# Patient Record
Sex: MALE | Race: WHITE | NOT HISPANIC OR LATINO | URBAN - METROPOLITAN AREA
[De-identification: names, ages, dates, MRNs, and addresses within clinical notes are randomized per-mention and may not be internally consistent; named-entity substitution may affect disease eponyms.]

---
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
## 2024-09-30 ENCOUNTER — OFFICE VISIT (OUTPATIENT)
Dept: URGENT CARE | Facility: CLINIC | Age: 24
End: 2024-09-30

## 2024-09-30 VITALS
OXYGEN SATURATION: 98 % | SYSTOLIC BLOOD PRESSURE: 116 MMHG | HEART RATE: 72 BPM | TEMPERATURE: 98.7 F | RESPIRATION RATE: 16 BRPM | DIASTOLIC BLOOD PRESSURE: 62 MMHG

## 2024-09-30 DIAGNOSIS — F10.90 ALCOHOL USE DISORDER: ICD-10-CM

## 2024-09-30 ASSESSMENT — ENCOUNTER SYMPTOMS
SHORTNESS OF BREATH: 0
MEMORY LOSS: 0

## 2024-09-30 ASSESSMENT — LIFESTYLE VARIABLES: SUBSTANCE_ABUSE: 1

## 2024-09-30 NOTE — LETTER
September 30, 2024         Patient: Leo Barrios   YOB: 2000   Date of Visit: 9/30/2024           To Whom it May Concern:    Leo Barrios was seen in my clinic on 9/30/2024.  Please excuse his absence from work today and tomorrow.    If you have any questions or concerns, please don't hesitate to call.        Sincerely,           Young Dotson P.A.-C.  Electronically Signed

## 2024-09-30 NOTE — PROGRESS NOTES
"  Subjective:   Leo Barrios is a 24 y.o. male who presents today with   Chief Complaint   Patient presents with    Other     Patient requesting disulfiram        Other  Pertinent negatives include no chest pain.       Patient states he is coming in to seek treatment for alcohol use.  He states that he has been drinking most days when he \"has free time\".  He states the last time he drank was on Saturday this weekend and had about 8 beers and a bottle of whiskey.  He states this is pretty typical for him.  He is not having any acute symptoms.  Is not on any medications and states he does not use any other drugs.  Patient is requesting disulfiram to be prescribed at this time to help with his alcohol use.  No suicidal or homicidal ideation.    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    He has no past medical history of Depression or Heart murmur.  MEDS: No current outpatient medications on file.  ALLERGIES: No Known Allergies  SURGHX: No past surgical history on file.  SOCHX:  reports that he has never smoked. He has never used smokeless tobacco. He reports that he does not drink alcohol and does not use drugs.  FH: Reviewed with patient, not pertinent to this visit.       Review of Systems   Respiratory:  Negative for shortness of breath.    Cardiovascular:  Negative for chest pain.   Psychiatric/Behavioral:  Positive for substance abuse. Negative for memory loss and suicidal ideas.         Objective:   /62 (BP Location: Left arm, Patient Position: Sitting, BP Cuff Size: Adult)   Pulse 72   Temp 37.1 °C (98.7 °F) (Temporal)   Resp 16   SpO2 98%   Physical Exam  Vitals and nursing note reviewed. "   Constitutional:       General: He is not in acute distress.     Appearance: Normal appearance. He is well-developed. He is not ill-appearing or toxic-appearing.   HENT:      Head: Normocephalic and atraumatic.      Right Ear: Hearing normal.      Left Ear: Hearing normal.   Cardiovascular:      Rate and Rhythm: Normal rate.   Pulmonary:      Effort: Pulmonary effort is normal.   Abdominal:      General: Bowel sounds are normal. There is no distension.      Palpations: Abdomen is soft.      Tenderness: There is no abdominal tenderness. There is no guarding.   Musculoskeletal:      Comments: Normal movement in all 4 extremities   Skin:     General: Skin is warm and dry.   Neurological:      Mental Status: He is alert.      Coordination: Coordination normal.   Psychiatric:         Mood and Affect: Mood normal.           Assessment/Plan:   Assessment    1. Alcohol use disorder  - Referral to Behavioral Health    Discussed with patient that we do not typically treat with disulfiram from the urgent care setting and I believe he would benefit more [facility] and treatment and he is agreeable with this plan.  Patient does not show any signs of alcohol use today on my exam and he states the last time he drank alcohol was 2 days ago.  Patient is agreeable to going to the [facility] at this time and provided him with information including address in details.  He elects to drive himself there by private vehicle.  Referral packet sent to [facility] via fax at this time.    Please note that this dictation was created using voice recognition software. I have made every reasonable attempt to correct obvious errors, but I expect that there are errors of grammar and possibly content that I did not discover before finalizing the note.    Young Dotson PA-C